# Patient Record
Sex: FEMALE | Race: OTHER | HISPANIC OR LATINO | URBAN - METROPOLITAN AREA
[De-identification: names, ages, dates, MRNs, and addresses within clinical notes are randomized per-mention and may not be internally consistent; named-entity substitution may affect disease eponyms.]

---

## 2024-04-10 ENCOUNTER — APPOINTMENT (RX ONLY)
Dept: URBAN - METROPOLITAN AREA CLINIC 77 | Facility: CLINIC | Age: 24
Setting detail: DERMATOLOGY
End: 2024-04-10

## 2024-04-10 DIAGNOSIS — L40.0 PSORIASIS VULGARIS: ICD-10-CM

## 2024-04-10 PROCEDURE — ? PRESCRIPTION MEDICATION MANAGEMENT

## 2024-04-10 PROCEDURE — ? COUNSELING

## 2024-04-10 PROCEDURE — ? CHRONOLOGY OF PRESENT ILLNESS

## 2024-04-10 PROCEDURE — ? PRESCRIPTION

## 2024-04-10 PROCEDURE — 99203 OFFICE O/P NEW LOW 30 MIN: CPT

## 2024-04-10 RX ORDER — CLOBETASOL PROPIONATE 0.5 MG/ML
THIN LAYER SOLUTION TOPICAL QHS
Qty: 50 | Refills: 4 | Status: ERX | COMMUNITY
Start: 2024-04-10

## 2024-04-10 RX ORDER — TRIAMCINOLONE ACETONIDE 1 MG/G
THIN LAYER CREAM TOPICAL BID
Qty: 453.6 | Refills: 4 | Status: ERX | COMMUNITY
Start: 2024-04-10

## 2024-04-10 RX ORDER — HYDROXYZINE HYDROCHLORIDE 50 MG/1
1 TABLET TABLET, FILM COATED ORAL PRN
Qty: 30 | Refills: 0 | Status: ERX | COMMUNITY
Start: 2024-04-10

## 2024-04-10 RX ADMIN — CLOBETASOL PROPIONATE THIN LAYER: 0.5 SOLUTION TOPICAL at 00:00

## 2024-04-10 RX ADMIN — HYDROXYZINE HYDROCHLORIDE 1 TABLET: 50 TABLET, FILM COATED ORAL at 00:00

## 2024-04-10 RX ADMIN — TRIAMCINOLONE ACETONIDE THIN LAYER: 1 CREAM TOPICAL at 00:00

## 2024-04-10 ASSESSMENT — LOCATION SIMPLE DESCRIPTION DERM
LOCATION SIMPLE: LEFT FOREARM
LOCATION SIMPLE: LEFT THIGH
LOCATION SIMPLE: RIGHT UPPER BACK
LOCATION SIMPLE: RIGHT THIGH
LOCATION SIMPLE: LEFT FOREHEAD
LOCATION SIMPLE: CHEST
LOCATION SIMPLE: RIGHT FOREARM

## 2024-04-10 ASSESSMENT — LOCATION DETAILED DESCRIPTION DERM
LOCATION DETAILED: LEFT INFERIOR FOREHEAD
LOCATION DETAILED: LEFT ANTERIOR PROXIMAL THIGH
LOCATION DETAILED: RIGHT ANTERIOR PROXIMAL THIGH
LOCATION DETAILED: LEFT VENTRAL PROXIMAL FOREARM
LOCATION DETAILED: UPPER STERNUM
LOCATION DETAILED: RIGHT SUPERIOR UPPER BACK
LOCATION DETAILED: RIGHT VENTRAL PROXIMAL FOREARM

## 2024-04-10 ASSESSMENT — BSA PSORIASIS: % BODY COVERED IN PSORIASIS: 40

## 2024-04-10 ASSESSMENT — LOCATION ZONE DERM
LOCATION ZONE: TRUNK
LOCATION ZONE: ARM
LOCATION ZONE: LEG
LOCATION ZONE: FACE

## 2024-04-10 ASSESSMENT — ITCH NUMERIC RATING SCALE: HOW SEVERE IS YOUR ITCHING?: 8

## 2024-04-10 NOTE — PROCEDURE: PRESCRIPTION MEDICATION MANAGEMENT
Detail Level: Zone
Initiate Treatment: triamcinolone acetonide 0.1 % topical cream BID\\nSig: Apply thin layer to AA of the arms and legs BID x 2 weeks then PRN for flares/maintenance\\n\\nhydroxyzine HCl 50 mg tablet PRN\\nSig: Take 1 tablet PO at bedtime PRN\\n\\nclobetasol 0.05 % scalp solution QHS\\nSig: Apply thin layer to AA on scalp BID for a few days or until symptoms resolve.
Render In Strict Bullet Format?: No

## 2024-04-10 NOTE — PROCEDURE: CHRONOLOGY OF PRESENT ILLNESS
Detail Level: Zone
Chronology Of Present Illness: 4/10/24\\nInitial presentation of rash all over body. Patient had biopsy done when she was 10 years old and it was consistent with psoriasis. Patient has tried clobetsoal , Triamcinolone, and betamethasone creams and has failed. Patient has also tried light therapy with no relief. 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 Discussed otezla and sotyktu. Discussed Tremfya or skyrizi. Patient opts to go the topical route before oral and injectable therapy. Will send in Triamcinolone cream, hydroxyzine at night for itch, and clobetsoal solution. F/u in 3 weeks.

## 2024-05-01 ENCOUNTER — APPOINTMENT (RX ONLY)
Dept: URBAN - METROPOLITAN AREA CLINIC 77 | Facility: CLINIC | Age: 24
Setting detail: DERMATOLOGY
End: 2024-05-01

## 2024-05-01 DIAGNOSIS — L40.0 PSORIASIS VULGARIS: ICD-10-CM | Status: WELL CONTROLLED

## 2024-05-01 PROCEDURE — ? PRESCRIPTION MEDICATION MANAGEMENT

## 2024-05-01 PROCEDURE — 99214 OFFICE O/P EST MOD 30 MIN: CPT

## 2024-05-01 PROCEDURE — ? COUNSELING

## 2024-05-01 PROCEDURE — ? CHRONOLOGY OF PRESENT ILLNESS

## 2024-05-01 ASSESSMENT — LOCATION SIMPLE DESCRIPTION DERM
LOCATION SIMPLE: CHEST
LOCATION SIMPLE: LEFT FOREARM
LOCATION SIMPLE: LEFT THIGH
LOCATION SIMPLE: LEFT FOREHEAD
LOCATION SIMPLE: RIGHT UPPER BACK
LOCATION SIMPLE: RIGHT THIGH
LOCATION SIMPLE: RIGHT FOREARM

## 2024-05-01 ASSESSMENT — LOCATION DETAILED DESCRIPTION DERM
LOCATION DETAILED: LEFT ANTERIOR PROXIMAL THIGH
LOCATION DETAILED: RIGHT ANTERIOR PROXIMAL THIGH
LOCATION DETAILED: RIGHT VENTRAL PROXIMAL FOREARM
LOCATION DETAILED: UPPER STERNUM
LOCATION DETAILED: LEFT VENTRAL PROXIMAL FOREARM
LOCATION DETAILED: RIGHT SUPERIOR UPPER BACK
LOCATION DETAILED: LEFT INFERIOR FOREHEAD

## 2024-05-01 ASSESSMENT — ITCH NUMERIC RATING SCALE: HOW SEVERE IS YOUR ITCHING?: 0

## 2024-05-01 ASSESSMENT — LOCATION ZONE DERM
LOCATION ZONE: FACE
LOCATION ZONE: ARM
LOCATION ZONE: TRUNK
LOCATION ZONE: LEG

## 2024-05-01 ASSESSMENT — BSA PSORIASIS: % BODY COVERED IN PSORIASIS: 15

## 2024-05-01 NOTE — PROCEDURE: CHRONOLOGY OF PRESENT ILLNESS
Detail Level: Zone
Chronology Of Present Illness: 4/10/24\\nInitial presentation of rash all over body. Patient had biopsy done when she was 10 years old and it was consistent with psoriasis. Patient has tried clobetsoal , Triamcinolone, and betamethasone creams and has failed. Patient has also tried light therapy with no relief. 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 Discussed otezla and sotyktu. Discussed Tremfya or skyrizi. Patient opts to go the topical route before oral and injectable therapy. Will send in Triamcinolone cream, hydroxyzine at night for itch, and clobetsoal solution. F/u in 3 weeks.\\n\\n5/1/24\\nScalp is clear on exam. Extremities have improved. Itching has improved significantly. Was taking hydroxyzine nightly but is no longer needing. Advised patient to continue with topicals for maintenance and as needed for flares. Patient inquired about oral medications. Rediscussed otezla and sotyktu. Patient is interested in beginning otezla. Samples given today - will initiate authorization process. 4 sample packs given today. Will f/u in 1 month.

## 2024-05-01 NOTE — PROCEDURE: PRESCRIPTION MEDICATION MANAGEMENT
Detail Level: Zone
Initiate Treatment: Otezla starter pack
Render In Strict Bullet Format?: No
Samples Given: otezla
Continue Regimen: triamcinolone acetonide 0.1 % topical cream BID\\nSig: Apply thin layer to AA of the arms and legs BID x 2 weeks then PRN for flares/maintenance\\n\\nhydroxyzine HCl 50 mg tablet PRN\\nSig: Take 1 tablet PO at bedtime PRN\\n\\nclobetasol 0.05 % scalp solution QHS\\nSig: Apply thin layer to AA on scalp BID for a few days or until symptoms resolve.

## 2024-05-09 ENCOUNTER — APPOINTMENT (RX ONLY)
Dept: URBAN - METROPOLITAN AREA CLINIC 77 | Facility: CLINIC | Age: 24
Setting detail: DERMATOLOGY
End: 2024-05-09

## 2024-05-09 DIAGNOSIS — L40.0 PSORIASIS VULGARIS: ICD-10-CM

## 2024-05-09 PROCEDURE — ? PRESCRIPTION MEDICATION MANAGEMENT

## 2024-05-09 PROCEDURE — 99214 OFFICE O/P EST MOD 30 MIN: CPT

## 2024-05-09 PROCEDURE — ? CHRONOLOGY OF PRESENT ILLNESS

## 2024-05-09 PROCEDURE — ? COUNSELING

## 2024-05-09 ASSESSMENT — LOCATION SIMPLE DESCRIPTION DERM
LOCATION SIMPLE: LEFT FOREARM
LOCATION SIMPLE: LEFT FOREHEAD
LOCATION SIMPLE: RIGHT UPPER BACK
LOCATION SIMPLE: LEFT THIGH
LOCATION SIMPLE: RIGHT THIGH
LOCATION SIMPLE: CHEST
LOCATION SIMPLE: RIGHT FOREARM

## 2024-05-09 ASSESSMENT — LOCATION DETAILED DESCRIPTION DERM
LOCATION DETAILED: UPPER STERNUM
LOCATION DETAILED: LEFT ANTERIOR PROXIMAL THIGH
LOCATION DETAILED: RIGHT SUPERIOR UPPER BACK
LOCATION DETAILED: RIGHT ANTERIOR PROXIMAL THIGH
LOCATION DETAILED: LEFT INFERIOR FOREHEAD
LOCATION DETAILED: RIGHT VENTRAL PROXIMAL FOREARM
LOCATION DETAILED: LEFT VENTRAL PROXIMAL FOREARM

## 2024-05-09 ASSESSMENT — LOCATION ZONE DERM
LOCATION ZONE: ARM
LOCATION ZONE: TRUNK
LOCATION ZONE: LEG
LOCATION ZONE: FACE

## 2024-05-09 NOTE — PROCEDURE: PRESCRIPTION MEDICATION MANAGEMENT
Detail Level: Zone
Render In Strict Bullet Format?: No
Samples Given: otezla
Continue Regimen: triamcinolone acetonide 0.1 % topical cream BID\\nSig: Apply thin layer to AA of the arms and legs BID x 2 weeks then PRN for flares/maintenance\\n\\nhydroxyzine HCl 50 mg tablet PRN\\nSig: Take 1 tablet PO at bedtime PRN\\n\\nclobetasol 0.05 % scalp solution QHS\\nSig: Apply thin layer to AA on scalp BID for a few days or until symptoms resolve.

## 2024-05-09 NOTE — PROCEDURE: CHRONOLOGY OF PRESENT ILLNESS
Detail Level: Zone
Chronology Of Present Illness: Failed Otezla \\n\\n4/10/24\\nInitial presentation of rash all over body. Patient had biopsy done when she was 10 years old and it was consistent with psoriasis. Patient has tried clobetsoal , Triamcinolone, and betamethasone creams and has failed. Patient has also tried light therapy with no relief. 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 Discussed otezla and sotyktu. Discussed Tremfya or skyrizi. Patient opts to go the topical route before oral and injectable therapy. Will send in Triamcinolone cream, hydroxyzine at night for itch, and clobetsoal solution. F/u in 3 weeks.\\n\\n5/1/24\\nScalp is clear on exam. Extremities have improved. Itching has improved significantly. Was taking hydroxyzine nightly but is no longer needing. Advised patient to continue with topicals for maintenance and as needed for flares. Patient inquired about oral medications. Rediscussed otezla and sotyktu. Patient is interested in beginning otezla. Samples given today - will initiate authorization process. 4 sample packs given today. Will f/u in 1 month.\\n\\n5/9/24\\nPatient reports having GI issues and emotional distress when starting samples of Otezla. Patient reports to stopping and symptoms resolved. Discussed sotyktu being next option but opts to stick with topicals for now. Will f/u in 6 months.

## 2024-12-31 ENCOUNTER — RX ONLY (RX ONLY)
Age: 24
End: 2024-12-31

## 2024-12-31 RX ORDER — HYDROXYZINE HYDROCHLORIDE 50 MG/1
1 TABLET TABLET, FILM COATED ORAL PRN
Qty: 30 | Refills: 2 | Status: ERX